# Patient Record
Sex: FEMALE | Race: BLACK OR AFRICAN AMERICAN
[De-identification: names, ages, dates, MRNs, and addresses within clinical notes are randomized per-mention and may not be internally consistent; named-entity substitution may affect disease eponyms.]

---

## 2020-02-14 ENCOUNTER — HOSPITAL ENCOUNTER (EMERGENCY)
Dept: HOSPITAL 5 - ED | Age: 80
LOS: 1 days | Discharge: OTHER INSTITUTION | End: 2020-02-15
Payer: MEDICARE

## 2020-02-14 DIAGNOSIS — I10: ICD-10-CM
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
## 2020-02-14 DIAGNOSIS — Z79.899: ICD-10-CM
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
## 2020-02-14 DIAGNOSIS — R07.89: ICD-10-CM

## 2020-02-14 DIAGNOSIS — Z98.890: ICD-10-CM
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
## 2020-02-14 DIAGNOSIS — S22.22XA: Primary | ICD-10-CM

## 2020-02-14 DIAGNOSIS — J81.0: ICD-10-CM

## 2020-02-14 DIAGNOSIS — E11.9: ICD-10-CM

## 2020-02-14 LAB
BASOPHILS # (AUTO): 0.1 K/MM3 (ref 0–0.1)
BASOPHILS NFR BLD AUTO: 1 % (ref 0–1.8)
BUN SERPL-MCNC: 19 MG/DL (ref 7–17)
BUN/CREAT SERPL: 24 %
CALCIUM SERPL-MCNC: 9.8 MG/DL (ref 8.4–10.2)
EOSINOPHIL # BLD AUTO: 0.3 K/MM3 (ref 0–0.4)
EOSINOPHIL NFR BLD AUTO: 3.5 % (ref 0–4.3)
HCT VFR BLD CALC: 39.1 % (ref 30.3–42.9)
HEMOLYSIS INDEX: 6
HGB BLD-MCNC: 12.6 GM/DL (ref 10.1–14.3)
LYMPHOCYTES # BLD AUTO: 2.3 K/MM3 (ref 1.2–5.4)
LYMPHOCYTES NFR BLD AUTO: 26.4 % (ref 13.4–35)
MCHC RBC AUTO-ENTMCNC: 32 % (ref 30–34)
MCV RBC AUTO: 70 FL (ref 79–97)
MONOCYTES # (AUTO): 0.8 K/MM3 (ref 0–0.8)
MONOCYTES % (AUTO): 9.6 % (ref 0–7.3)
PLATELET # BLD: 215 K/MM3 (ref 140–440)
RBC # BLD AUTO: 5.62 M/MM3 (ref 3.65–5.03)

## 2020-02-14 PROCEDURE — 96376 TX/PRO/DX INJ SAME DRUG ADON: CPT

## 2020-02-14 PROCEDURE — 93005 ELECTROCARDIOGRAM TRACING: CPT

## 2020-02-14 PROCEDURE — 84484 ASSAY OF TROPONIN QUANT: CPT

## 2020-02-14 PROCEDURE — 36415 COLL VENOUS BLD VENIPUNCTURE: CPT

## 2020-02-14 PROCEDURE — 96374 THER/PROPH/DIAG INJ IV PUSH: CPT

## 2020-02-14 PROCEDURE — 93010 ELECTROCARDIOGRAM REPORT: CPT

## 2020-02-14 PROCEDURE — 96375 TX/PRO/DX INJ NEW DRUG ADDON: CPT

## 2020-02-14 PROCEDURE — 71275 CT ANGIOGRAPHY CHEST: CPT

## 2020-02-14 PROCEDURE — 85025 COMPLETE CBC W/AUTO DIFF WBC: CPT

## 2020-02-14 PROCEDURE — 80048 BASIC METABOLIC PNL TOTAL CA: CPT

## 2020-02-14 PROCEDURE — 71045 X-RAY EXAM CHEST 1 VIEW: CPT

## 2020-02-14 PROCEDURE — 99285 EMERGENCY DEPT VISIT HI MDM: CPT

## 2020-02-14 NOTE — EVENT NOTE
ED Screening Note


Date of service: 02/14/20


Time: 19:15


ED Screening Note: 


Chest pain 





This initial assessment/diagnostic orders/clinical plan/treatment(s) is/are 

subject to change based on patients health status, clinical progression and re-

assessment by fellow clinical providers in the ED. Further treatment and workup 

at subsequent clinical providers discretion. Patient/guardian urged not to elope

from the ED as their condition may be serious if not clinically assessed and 

managed. 





Initial orders include:
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
## 2020-02-14 NOTE — XRAY REPORT
CHEST 1 VIEW 



INDICATION / CLINICAL INFORMATION:

Chest Pain.



COMPARISON: 

3/25/2011



FINDINGS:



SUPPORT DEVICES: None.



HEART / MEDIASTINUM: Critics silhouette size is upper limits of normal. 



LUNGS / PLEURA: There is mild pulmonary vascular congestion present. The lungs are otherwise grossly 
clear. No significant pleural effusion. No pneumothorax. 



ADDITIONAL FINDINGS: No significant additional findings.



IMPRESSION:

1. Mild pulmonary vascular congestion. The lungs are grossly clear otherwise..



Signer Name: Marcela Hope MD 

Signed: 2/14/2020 7:46 PM

 Workstation Name: [id number]
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
## 2020-02-15 VITALS — SYSTOLIC BLOOD PRESSURE: 107 MMHG | DIASTOLIC BLOOD PRESSURE: 71 MMHG

## 2020-12-08 ENCOUNTER — HOSPITAL ENCOUNTER (EMERGENCY)
Dept: HOSPITAL 5 - ED | Age: 80
LOS: 1 days | Discharge: HOME | End: 2020-12-09
Payer: MEDICARE

## 2020-12-08 DIAGNOSIS — Z20.828: ICD-10-CM

## 2020-12-08 DIAGNOSIS — I10: ICD-10-CM

## 2020-12-08 DIAGNOSIS — J06.9: Primary | ICD-10-CM

## 2020-12-08 DIAGNOSIS — E11.9: ICD-10-CM

## 2020-12-08 DIAGNOSIS — Z79.899: ICD-10-CM

## 2020-12-08 DIAGNOSIS — Z98.890: ICD-10-CM

## 2020-12-08 LAB
ALBUMIN SERPL-MCNC: 4.1 G/DL (ref 3.9–5)
ALT SERPL-CCNC: 26 UNITS/L (ref 7–56)
BASOPHILS # (AUTO): 0.1 K/MM3 (ref 0–0.1)
BASOPHILS NFR BLD AUTO: 0.7 % (ref 0–1.8)
BUN SERPL-MCNC: 17 MG/DL (ref 7–17)
BUN/CREAT SERPL: 21 %
CALCIUM SERPL-MCNC: 9 MG/DL (ref 8.4–10.2)
EOSINOPHIL # BLD AUTO: 0 K/MM3 (ref 0–0.4)
EOSINOPHIL NFR BLD AUTO: 0.2 % (ref 0–4.3)
HCT VFR BLD CALC: 38.3 % (ref 30.3–42.9)
HEMOLYSIS INDEX: 2
HGB BLD-MCNC: 12.4 GM/DL (ref 10.1–14.3)
INR PPP: 1.05 (ref 0.87–1.13)
LYMPHOCYTES # BLD AUTO: 1.8 K/MM3 (ref 1.2–5.4)
LYMPHOCYTES NFR BLD AUTO: 20.4 % (ref 13.4–35)
MCHC RBC AUTO-ENTMCNC: 32 % (ref 30–34)
MCV RBC AUTO: 70 FL (ref 79–97)
MONOCYTES # (AUTO): 1.1 K/MM3 (ref 0–0.8)
MONOCYTES % (AUTO): 12.4 % (ref 0–7.3)
PLATELET # BLD: 231 K/MM3 (ref 140–440)
RBC # BLD AUTO: 5.46 M/MM3 (ref 3.65–5.03)

## 2020-12-08 PROCEDURE — 86140 C-REACTIVE PROTEIN: CPT

## 2020-12-08 PROCEDURE — 87040 BLOOD CULTURE FOR BACTERIA: CPT

## 2020-12-08 PROCEDURE — 87086 URINE CULTURE/COLONY COUNT: CPT

## 2020-12-08 PROCEDURE — 82947 ASSAY GLUCOSE BLOOD QUANT: CPT

## 2020-12-08 PROCEDURE — 83615 LACTATE (LD) (LDH) ENZYME: CPT

## 2020-12-08 PROCEDURE — 82805 BLOOD GASES W/O2 SATURATION: CPT

## 2020-12-08 PROCEDURE — 71275 CT ANGIOGRAPHY CHEST: CPT

## 2020-12-08 PROCEDURE — 81001 URINALYSIS AUTO W/SCOPE: CPT

## 2020-12-08 PROCEDURE — 82728 ASSAY OF FERRITIN: CPT

## 2020-12-08 PROCEDURE — 71046 X-RAY EXAM CHEST 2 VIEWS: CPT

## 2020-12-08 PROCEDURE — 84484 ASSAY OF TROPONIN QUANT: CPT

## 2020-12-08 PROCEDURE — 85025 COMPLETE CBC W/AUTO DIFF WBC: CPT

## 2020-12-08 PROCEDURE — 99285 EMERGENCY DEPT VISIT HI MDM: CPT

## 2020-12-08 PROCEDURE — 96374 THER/PROPH/DIAG INJ IV PUSH: CPT

## 2020-12-08 PROCEDURE — 36415 COLL VENOUS BLD VENIPUNCTURE: CPT

## 2020-12-08 PROCEDURE — 80053 COMPREHEN METABOLIC PANEL: CPT

## 2020-12-08 PROCEDURE — 82140 ASSAY OF AMMONIA: CPT

## 2020-12-08 PROCEDURE — 85610 PROTHROMBIN TIME: CPT

## 2020-12-08 PROCEDURE — 93005 ELECTROCARDIOGRAM TRACING: CPT

## 2020-12-08 PROCEDURE — 85379 FIBRIN DEGRADATION QUANT: CPT

## 2020-12-08 PROCEDURE — 84145 PROCALCITONIN (PCT): CPT

## 2020-12-08 NOTE — XRAY REPORT
CHEST 2 VIEWS 



INDICATION / CLINICAL INFORMATION:

possible Sepsis.



COMPARISON: 

February 14 2020



FINDINGS:



SUPPORT DEVICES: None.

HEART / MEDIASTINUM: No significant abnormality. 

LUNGS / PLEURA: Mild bibasilar atelectasis similar when compared to 2/14/2020. No significant pulmona
ry or pleural abnormality. No pneumothorax. 



ADDITIONAL FINDINGS: No significant additional findings.



IMPRESSION:

1. No acute findings.



Signer Name: Buck Shetty MD 

Signed: 12/8/2020 5:02 PM

Workstation Name: [id number]

## 2020-12-09 VITALS — DIASTOLIC BLOOD PRESSURE: 61 MMHG | SYSTOLIC BLOOD PRESSURE: 110 MMHG

## 2020-12-09 LAB
BILIRUB UR QL STRIP: (no result)
BLOOD UR QL VISUAL: (no result)
CRP SERPL-MCNC: 4.6 MG/DL (ref 0–1.3)
MUCOUS THREADS #/AREA URNS HPF: (no result) /HPF
PH UR STRIP: 6 [PH] (ref 5–7)
PROT UR STRIP-MCNC: (no result) MG/DL
RBC #/AREA URNS HPF: 3 /HPF (ref 0–6)
UROBILINOGEN UR-MCNC: < 2 MG/DL (ref ?–2)
WBC #/AREA URNS HPF: 2 /HPF (ref 0–6)

## 2020-12-09 NOTE — EMERGENCY DEPARTMENT REPORT
HPI





- General


Chief Complaint: Dyspnea/Respdistress


Time Seen by Provider: 12/09/20 08:11





- HPI


HPI: 





This is an 80-year-old  female who presents to the emergency department 

from the office of her PCP with a complaint of a 3-day history of fever, mixed 

dry and productive cough, shortness of breath, chills and generalized body 

aches.  She was seen by her PCP yesterday, Dr. Titi Fishman, and sent in for 

further evaluation with concern of COVID-19.  no Covid test done.  Patient has a

past medical history of hypertension and diabetes.  No recent travel or sick 

contacts at home.  No known exposure to anyone with COVID-19.  Patient does 

complain of some chest, or chest wall, discomfort that occurs when the patient 

is coughing.  She denies any lower extremity swelling, abdominal pain, vomiting,

diarrhea.  The patient does not speak any English, but the patient's daughter is

currently at bedside translating for us.





ED Past Medical Hx





- Past Medical History


Previous Medical History?: Yes


Hx Hypertension: Yes


Hx Diabetes: Yes





- Surgical History


Additional Surgical History: Unk, possible liver surgery





- Social History


Smoking Status: Never Smoker


Substance Use Type: None





- Medications


Home Medications: 


                                Home Medications











 Medication  Instructions  Recorded  Confirmed  Last Taken  Type


 


Amlodipine Bes/Olmesartan Med 5 mg PO DAILY 03/18/14 09/13/15 03/16/14 History





[Rakel 10-20 mg]     


 


Metoprolol [Lopressor TAB] 25 mg PO DAILY 03/18/14 09/13/15 03/16/14 History


 


Omega-3 Fatty Acids/Fish Oil [Fish 1,000 mg PO DAILY 03/18/14 09/13/15 03/16/14 

History





Oil]     


 


raNITIdine HCL [Ranitidine 300mg 300 mg PO DAILY 03/18/14 09/13/15 03/16/14 

History





Cap]     


 


Ondansetron [Zofran TAB] 4 mg PO Q6HR PRN #10 tablet 09/05/14 09/13/15 Unknown 

Rx


 


traMADoL [Ultram 50 MG tab] 50 mg PO Q6HR PRN #14 tablet 09/05/14 09/13/15 

Unknown Rx


 


Aspirin [Aspirin BABY CHEW TAB] 81 mg PO QDAY  tab.chew 09/14/15  Unknown Rx


 


Albuterol Mdi (or & Nicu Only) 2 puff IH QID PRN #8.5 gram 12/09/20  Unknown Rx





[ProAir HFA Inhaler]     


 


Benzonatate [Tessalon Perles] 100 mg PO Q8HR PRN #20 capsule 12/09/20  Unknown 

Rx


 


predniSONE [Deltasone] 20 mg PO QDAY #3 tab 12/09/20  Unknown Rx














ED Review of Systems


ROS: 


Stated complaint: SOB/FEVER/COUGHING


Other details as noted in HPI





Comment: All other systems reviewed and negative


Constitutional: chills, fever


Eyes: denies: eye pain, vision change


ENT: denies: ear pain, throat pain


Respiratory: cough, shortness of breath


Cardiovascular: chest pain.  denies: palpitations, edema


Gastrointestinal: denies: abdominal pain, vomiting


Genitourinary: denies: dysuria, discharge


Musculoskeletal: myalgia.  denies: joint swelling


Skin: denies: rash, lesions


Neurological: denies: numbness, paresthesias





Physical Exam





- Physical Exam


Vital Signs: 


                                   Vital Signs











  12/08/20 12/09/20 12/09/20





  16:22 04:31 08:15


 


Temperature 101.9 F H 100.4 F H 98.7 F


 


Pulse Rate 113 H 116 H 


 


Respiratory 24 19 





Rate   


 


Blood Pressure  120/63 


 


Blood Pressure 133/78  





[Right]   


 


O2 Sat by Pulse 94 92 





Oximetry   














  12/09/20





  08:19


 


Temperature 


 


Pulse Rate 100 H


 


Respiratory 23





Rate 


 


Blood Pressure 


 


Blood Pressure 109/75





[Right] 


 


O2 Sat by Pulse 95





Oximetry 











Physical Exam: 





GENERAL: The patient is well-developed well-nourished.


HENT: Normocephalic.  Atraumatic.    Patient has moist mucous membranes.


EYES: Extraocular motions are intact.  


NECK: Supple. Trachea is midline.


CHEST/LUNGS: Clear to auscultation.  A productive sounding cough heard during 

examination.  No tachypnea or accessory muscle use.  There is no respiratory 

distress noted.


HEART/CARDIOVASCULAR: Regular.  There is mild tachycardia.  There is no murmur.


ABDOMEN: Abdomen is soft, nontender.  Patient has normal bowel sounds.  


SKIN: Skin is warm and dry. 


NEURO: The patient is awake, alert, and oriented.  The patient is cooperative. 

Normal speech.


MUSCULOSKELETAL: There is no tenderness or deformity.  There is no limitation 

range of motion.  





ED Course


                                   Vital Signs











  12/08/20 12/09/20 12/09/20





  16:22 04:31 08:15


 


Temperature 101.9 F H 100.4 F H 98.7 F


 


Pulse Rate 113 H 116 H 


 


Respiratory 24 19 





Rate   


 


Blood Pressure  120/63 


 


Blood Pressure 133/78  





[Right]   


 


O2 Sat by Pulse 94 92 





Oximetry   














  12/09/20





  08:19


 


Temperature 


 


Pulse Rate 100 H


 


Respiratory 23





Rate 


 


Blood Pressure 


 


Blood Pressure 109/75





[Right] 


 


O2 Sat by Pulse 95





Oximetry 














- Reevaluation(s)


Reevaluation #1: 





12/09/20 15:05


                                   Lab Results











  12/08/20 12/08/20 12/08/20 Range/Units





  17:04 17:04 17:04 


 


WBC  9.0    (4.5-11.0)  K/mm3


 


RBC  5.46 H    (3.65-5.03)  M/mm3


 


Hgb  12.4    (10.1-14.3)  gm/dl


 


Hct  38.3    (30.3-42.9)  %


 


MCV  70 L    (79-97)  fl


 


MCH  23 L    (28-32)  pg


 


MCHC  32    (30-34)  %


 


RDW  16.5 H    (13.2-15.2)  %


 


Plt Count  231    (140-440)  K/mm3


 


Lymph % (Auto)  20.4    (13.4-35.0)  %


 


Mono % (Auto)  12.4 H    (0.0-7.3)  %


 


Eos % (Auto)  0.2    (0.0-4.3)  %


 


Baso % (Auto)  0.7    (0.0-1.8)  %


 


Lymph # (Auto)  1.8    (1.2-5.4)  K/mm3


 


Mono # (Auto)  1.1 H    (0.0-0.8)  K/mm3


 


Eos # (Auto)  0.0    (0.0-0.4)  K/mm3


 


Baso # (Auto)  0.1    (0.0-0.1)  K/mm3


 


Seg Neutrophils %  66.3    (40.0-70.0)  %


 


Seg Neutrophils #  6.0    (1.8-7.7)  K/mm3


 


PT   13.6   (12.2-14.9)  Sec.


 


INR   1.05   (0.87-1.13)  


 


D-Dimer     (0-234)  ng/mlDDU


 


VBG pH     (7.320-7.420)  


 


Sodium    132 L  (137-145)  mmol/L


 


Potassium    3.7  (3.6-5.0)  mmol/L


 


Chloride    96.4 L  ()  mmol/L


 


Carbon Dioxide    23  (22-30)  mmol/L


 


Anion Gap    16  mmol/L


 


BUN    17  (7-17)  mg/dL


 


Creatinine    0.8  (0.6-1.2)  mg/dL


 


Estimated GFR    > 60  ml/min


 


BUN/Creatinine Ratio    21  %


 


Glucose    172 H  ()  mg/dL


 


Lactic Acid     (0.7-2.0)  mmol/L


 


Calcium    9.0  (8.4-10.2)  mg/dL


 


Ferritin     (10.0-200.0)  ng/mL


 


Total Bilirubin    0.20  (0.1-1.2)  mg/dL


 


AST    21  (5-40)  units/L


 


ALT    26  (7-56)  units/L


 


Alkaline Phosphatase    57  ()  units/L


 


Lactate Dehydrogenase     ()  units/L


 


Troponin T     (0.00-0.029)  ng/mL


 


C-Reactive Protein     (0.00-1.30)  mg/dL


 


Total Protein    7.7  (6.3-8.2)  g/dL


 


Albumin    4.1  (3.9-5)  g/dL


 


Albumin/Globulin Ratio    1.1  %


 


Procalcitonin     (<0.15)  ng/mL


 


Urine Color     (Yellow)  


 


Urine Turbidity     (Clear)  


 


Urine pH     (5.0-7.0)  


 


Ur Specific Gravity     (1.003-1.030)  


 


Urine Protein     (Negative)  mg/dL


 


Urine Glucose (UA)     (Negative)  mg/dL


 


Urine Ketones     (Negative)  mg/dL


 


Urine Blood     (Negative)  


 


Urine Nitrite     (Negative)  


 


Urine Bilirubin     (Negative)  


 


Urine Urobilinogen     (<2.0)  mg/dL


 


Ur Leukocyte Esterase     (Negative)  


 


Urine WBC (Auto)     (0.0-6.0)  /HPF


 


Urine RBC (Auto)     (0.0-6.0)  /HPF


 


U Epithel Cells (Auto)     (0-13.0)  /HPF


 


Urine Mucus     /HPF














  12/08/20 12/08/20 12/08/20 Range/Units





  17:04 17:04 19:42 


 


WBC     (4.5-11.0)  K/mm3


 


RBC     (3.65-5.03)  M/mm3


 


Hgb     (10.1-14.3)  gm/dl


 


Hct     (30.3-42.9)  %


 


MCV     (79-97)  fl


 


MCH     (28-32)  pg


 


MCHC     (30-34)  %


 


RDW     (13.2-15.2)  %


 


Plt Count     (140-440)  K/mm3


 


Lymph % (Auto)     (13.4-35.0)  %


 


Mono % (Auto)     (0.0-7.3)  %


 


Eos % (Auto)     (0.0-4.3)  %


 


Baso % (Auto)     (0.0-1.8)  %


 


Lymph # (Auto)     (1.2-5.4)  K/mm3


 


Mono # (Auto)     (0.0-0.8)  K/mm3


 


Eos # (Auto)     (0.0-0.4)  K/mm3


 


Baso # (Auto)     (0.0-0.1)  K/mm3


 


Seg Neutrophils %     (40.0-70.0)  %


 


Seg Neutrophils #     (1.8-7.7)  K/mm3


 


PT     (12.2-14.9)  Sec.


 


INR     (0.87-1.13)  


 


D-Dimer     (0-234)  ng/mlDDU


 


VBG pH   7.476 H   (7.320-7.420)  


 


Sodium     (137-145)  mmol/L


 


Potassium     (3.6-5.0)  mmol/L


 


Chloride     ()  mmol/L


 


Carbon Dioxide     (22-30)  mmol/L


 


Anion Gap     mmol/L


 


BUN     (7-17)  mg/dL


 


Creatinine     (0.6-1.2)  mg/dL


 


Estimated GFR     ml/min


 


BUN/Creatinine Ratio     %


 


Glucose     ()  mg/dL


 


Lactic Acid  2.20 H*   1.60  (0.7-2.0)  mmol/L


 


Calcium     (8.4-10.2)  mg/dL


 


Ferritin     (10.0-200.0)  ng/mL


 


Total Bilirubin     (0.1-1.2)  mg/dL


 


AST     (5-40)  units/L


 


ALT     (7-56)  units/L


 


Alkaline Phosphatase     ()  units/L


 


Lactate Dehydrogenase     ()  units/L


 


Troponin T     (0.00-0.029)  ng/mL


 


C-Reactive Protein     (0.00-1.30)  mg/dL


 


Total Protein     (6.3-8.2)  g/dL


 


Albumin     (3.9-5)  g/dL


 


Albumin/Globulin Ratio     %


 


Procalcitonin     (<0.15)  ng/mL


 


Urine Color     (Yellow)  


 


Urine Turbidity     (Clear)  


 


Urine pH     (5.0-7.0)  


 


Ur Specific Gravity     (1.003-1.030)  


 


Urine Protein     (Negative)  mg/dL


 


Urine Glucose (UA)     (Negative)  mg/dL


 


Urine Ketones     (Negative)  mg/dL


 


Urine Blood     (Negative)  


 


Urine Nitrite     (Negative)  


 


Urine Bilirubin     (Negative)  


 


Urine Urobilinogen     (<2.0)  mg/dL


 


Ur Leukocyte Esterase     (Negative)  


 


Urine WBC (Auto)     (0.0-6.0)  /HPF


 


Urine RBC (Auto)     (0.0-6.0)  /HPF


 


U Epithel Cells (Auto)     (0-13.0)  /HPF


 


Urine Mucus     /HPF














  12/09/20 12/09/20 12/09/20 Range/Units





  09:56 09:56 09:56 


 


WBC     (4.5-11.0)  K/mm3


 


RBC     (3.65-5.03)  M/mm3


 


Hgb     (10.1-14.3)  gm/dl


 


Hct     (30.3-42.9)  %


 


MCV     (79-97)  fl


 


MCH     (28-32)  pg


 


MCHC     (30-34)  %


 


RDW     (13.2-15.2)  %


 


Plt Count     (140-440)  K/mm3


 


Lymph % (Auto)     (13.4-35.0)  %


 


Mono % (Auto)     (0.0-7.3)  %


 


Eos % (Auto)     (0.0-4.3)  %


 


Baso % (Auto)     (0.0-1.8)  %


 


Lymph # (Auto)     (1.2-5.4)  K/mm3


 


Mono # (Auto)     (0.0-0.8)  K/mm3


 


Eos # (Auto)     (0.0-0.4)  K/mm3


 


Baso # (Auto)     (0.0-0.1)  K/mm3


 


Seg Neutrophils %     (40.0-70.0)  %


 


Seg Neutrophils #     (1.8-7.7)  K/mm3


 


PT     (12.2-14.9)  Sec.


 


INR     (0.87-1.13)  


 


D-Dimer  377.73 H    (0-234)  ng/mlDDU


 


VBG pH     (7.320-7.420)  


 


Sodium     (137-145)  mmol/L


 


Potassium     (3.6-5.0)  mmol/L


 


Chloride     ()  mmol/L


 


Carbon Dioxide     (22-30)  mmol/L


 


Anion Gap     mmol/L


 


BUN     (7-17)  mg/dL


 


Creatinine     (0.6-1.2)  mg/dL


 


Estimated GFR     ml/min


 


BUN/Creatinine Ratio     %


 


Glucose   122 H   ()  mg/dL


 


Lactic Acid     (0.7-2.0)  mmol/L


 


Calcium     (8.4-10.2)  mg/dL


 


Ferritin    372.5 H  (10.0-200.0)  ng/mL


 


Total Bilirubin     (0.1-1.2)  mg/dL


 


AST     (5-40)  units/L


 


ALT     (7-56)  units/L


 


Alkaline Phosphatase     ()  units/L


 


Lactate Dehydrogenase   176   ()  units/L


 


Troponin T   < 0.010   (0.00-0.029)  ng/mL


 


C-Reactive Protein   4.60 H   (0.00-1.30)  mg/dL


 


Total Protein     (6.3-8.2)  g/dL


 


Albumin     (3.9-5)  g/dL


 


Albumin/Globulin Ratio     %


 


Procalcitonin     (<0.15)  ng/mL


 


Urine Color     (Yellow)  


 


Urine Turbidity     (Clear)  


 


Urine pH     (5.0-7.0)  


 


Ur Specific Gravity     (1.003-1.030)  


 


Urine Protein     (Negative)  mg/dL


 


Urine Glucose (UA)     (Negative)  mg/dL


 


Urine Ketones     (Negative)  mg/dL


 


Urine Blood     (Negative)  


 


Urine Nitrite     (Negative)  


 


Urine Bilirubin     (Negative)  


 


Urine Urobilinogen     (<2.0)  mg/dL


 


Ur Leukocyte Esterase     (Negative)  


 


Urine WBC (Auto)     (0.0-6.0)  /HPF


 


Urine RBC (Auto)     (0.0-6.0)  /HPF


 


U Epithel Cells (Auto)     (0-13.0)  /HPF


 


Urine Mucus     /HPF














  12/09/20 12/09/20 Range/Units





  09:56 Unknown 


 


WBC    (4.5-11.0)  K/mm3


 


RBC    (3.65-5.03)  M/mm3


 


Hgb    (10.1-14.3)  gm/dl


 


Hct    (30.3-42.9)  %


 


MCV    (79-97)  fl


 


MCH    (28-32)  pg


 


MCHC    (30-34)  %


 


RDW    (13.2-15.2)  %


 


Plt Count    (140-440)  K/mm3


 


Lymph % (Auto)    (13.4-35.0)  %


 


Mono % (Auto)    (0.0-7.3)  %


 


Eos % (Auto)    (0.0-4.3)  %


 


Baso % (Auto)    (0.0-1.8)  %


 


Lymph # (Auto)    (1.2-5.4)  K/mm3


 


Mono # (Auto)    (0.0-0.8)  K/mm3


 


Eos # (Auto)    (0.0-0.4)  K/mm3


 


Baso # (Auto)    (0.0-0.1)  K/mm3


 


Seg Neutrophils %    (40.0-70.0)  %


 


Seg Neutrophils #    (1.8-7.7)  K/mm3


 


PT    (12.2-14.9)  Sec.


 


INR    (0.87-1.13)  


 


D-Dimer    (0-234)  ng/mlDDU


 


VBG pH    (7.320-7.420)  


 


Sodium    (137-145)  mmol/L


 


Potassium    (3.6-5.0)  mmol/L


 


Chloride    ()  mmol/L


 


Carbon Dioxide    (22-30)  mmol/L


 


Anion Gap    mmol/L


 


BUN    (7-17)  mg/dL


 


Creatinine    (0.6-1.2)  mg/dL


 


Estimated GFR    ml/min


 


BUN/Creatinine Ratio    %


 


Glucose    ()  mg/dL


 


Lactic Acid    (0.7-2.0)  mmol/L


 


Calcium    (8.4-10.2)  mg/dL


 


Ferritin    (10.0-200.0)  ng/mL


 


Total Bilirubin    (0.1-1.2)  mg/dL


 


AST    (5-40)  units/L


 


ALT    (7-56)  units/L


 


Alkaline Phosphatase    ()  units/L


 


Lactate Dehydrogenase    ()  units/L


 


Troponin T    (0.00-0.029)  ng/mL


 


C-Reactive Protein    (0.00-1.30)  mg/dL


 


Total Protein    (6.3-8.2)  g/dL


 


Albumin    (3.9-5)  g/dL


 


Albumin/Globulin Ratio    %


 


Procalcitonin  < 0.05   (<0.15)  ng/mL


 


Urine Color   Yellow  (Yellow)  


 


Urine Turbidity   Clear  (Clear)  


 


Urine pH   6.0  (5.0-7.0)  


 


Ur Specific Gravity   1.018  (1.003-1.030)  


 


Urine Protein   <15 mg/dl  (Negative)  mg/dL


 


Urine Glucose (UA)   50  (Negative)  mg/dL


 


Urine Ketones   Neg  (Negative)  mg/dL


 


Urine Blood   Sm  (Negative)  


 


Urine Nitrite   Neg  (Negative)  


 


Urine Bilirubin   Neg  (Negative)  


 


Urine Urobilinogen   < 2.0  (<2.0)  mg/dL


 


Ur Leukocyte Esterase   Neg  (Negative)  


 


Urine WBC (Auto)   2.0  (0.0-6.0)  /HPF


 


Urine RBC (Auto)   3.0  (0.0-6.0)  /HPF


 


U Epithel Cells (Auto)   2.0  (0-13.0)  /HPF


 


Urine Mucus   Few  /HPF














ED Medical Decision Making





- Lab Data


Result diagrams: 


                                 12/08/20 17:04





                                 12/09/20 09:56





- EKG Data


-: EKG Interpreted by Me


EKG shows normal: sinus rhythm, axis, intervals, QRS complexes, ST-T waves


Rate: normal





- EKG Data


When compared to previous EKG there are: previous EKG unavailable


Interpretation: normal EKG





- Radiology Data


Radiology results: report reviewed, image reviewed


interpreted by me: 





Chest x-ray does not show any acute process.  There are no pleural effusions, 

obvious pneumonia and there is no pneumothorax. No significant cardiomegaly.





CTA chest with contrast INDICATION : SOB, elevated dimer. TECHNIQUE: Axial 

imaging performed through the chest, with contrast bolus timing set to maximize 

opacification of the pulmonary arteries. 3-plane MIP reformatted images were 

obtained. All CT scans at this location are performed using CT dose reduction 

for ALARA by means of automated exposure control. 100 mL of intravenous contrast

 administered. COMPARISON: CTA chest from 2/14/2000 FINDINGS: Bolus: Contrast 

bolus timing is adequate. PTE: No filling defect is present to suggest PTE. 

Mediastinum: Heart and great vessels appear normal. No pathologic mediastinal 

adenopathy. Lungs: There is minimal streaky bibasilar airspace disease which 

most likely at least in part reflects atelectasis. No dense consolidation or 

pleural effusion. Upper abdomen: Limited imaging of the upper abdomen shows 

nothing acute. Several tiny hepatic cysts are present. Bones: Degenerative 

changes in the spine with nothing acute. IMPRESSION: 1. Negative for PTE. 2. 

Streaky bibasilar atelectasis. 





- Medical Decision Making





This patient presents to the emergency department from the office of her PCP 

yesterday with complaint of shortness of breath, cough and fever with concern 

for patient having COVID-19.  There was no report or mention of the patient 

having any hypoxia.  Patient's chart was first presented to me, and the patient 

brought back to the main emergency department, shortly after my shift began at 6

 AM this morning.





Chest x-ray did not show any pneumonia, pleural effusions, pneumothorax, or any 

acute process.





The patient's labs are mostly unremarkable except for elevated inflammatory 

markers that could be consistent with a COVID-19 infection.  Because of the 

complaint of shortness of breath and the elevated D-dimer level, the patient had

 a CT angiography of the chest completed.  The results do not show any evidence 

of pulmonary embolism, or any other acute process.





The patient has been reevaluated multiple times over multiple hours and has 

remained stable.  Vital signs have been reassuring throughout her ED course.  

Patient had an initial fever of about 102 F but it came down and stayed down 

with 1 dose of Tylenol given.  The patient has not had any hypoxia either at 

rest, or after a 2-minute exertional test.  For all these reasons the patient 

appears safe for discharge home at this time.





She appears to have a viral upper respiratory infection.  Given the elevated 

inflammatory markers found on her labs, as well as the current pandemic, there 

is a high suspicion that the patient could have COVID-19.  Unfortunately we do 

not have point-of-care testing for COVID-19 at this time and I am unable to test

 her for it through the emergency department.  Patient will be given a 

prescription for steroids, an albuterol inhaler and an antitussive medication.  

We discussed isolation/quarantine from those who are immunocompromised, elderly,

 or chronically ill/debilitated.  They will seek outpatient COVID-19 testing.  

We discussed getting a finger pulse oximetry monitor.





She will return to the emergency department with any worsening of her symptoms 

or with any acute distress.


Critical Care Time: No


Critical care attestation.: 


If time is entered above; I have spent that time in minutes in the direct care 

of this critically ill patient, excluding procedure time.








ED Disposition


Clinical Impression: 


 Suspected 2019 novel coronavirus infection, Viral upper respiratory tract infe

ction





Disposition: DC-01 TO HOME OR SELFCARE


Is pt being admited?: No


Condition: Stable


Instructions:  COVID-19, Viral Respiratory Infection


Additional Instructions: 


Please follow-up with your primary care physician in the next few days.





You appear to have a viral upper respiratory infection.  Given the current 

pandemic, I have suspicion that you have COVID-19.  I am unable to test you for 

COVID-19 at this time through the emergency department.  I recommend that you s

[place] outpatient COVID-19 testing.  This can be done at some primary care offices,

 some urgent cares, and there should be a listing of testing facilities through 

the [facility].





Please isolate/quarantine away from anybody who is immunocompromised, elderly, 

or chronically ill/debilitated.





Take your medications as prescribed.





I recommend that you try to obtain a finger pulse oximeter, which can tell your 

oxygen saturation.  If the oxygen saturation drops into the low 90s or below, 

you have any worsening of your symptoms, new or concerning symptoms not 

addressed during this emergency department, or any signs of distress, please go 

to the closest emergency department.


Prescriptions: 


predniSONE [Deltasone] 20 mg PO QDAY #3 tab


Albuterol Mdi (or & Nicu Only) [ProAir HFA Inhaler] 2 puff IH QID PRN #8.5 gram


 PRN Reason: Shortness Of Breath


Benzonatate [Tessalon Perles] 100 mg PO Q8HR PRN #20 capsule


 PRN Reason: Cough


Referrals: 


TITI FISHMAN MD [Staff Physician] - 2-3 Days


Time of Disposition: 13:01

## 2020-12-09 NOTE — CAT SCAN REPORT
CTA chest with contrast



INDICATION : SOB, elevated dimer.



TECHNIQUE:  Axial imaging performed through the chest, with contrast bolus timing set to maximize opa
cification of the pulmonary arteries. 3-plane MIP reformatted images were obtained.  All CT scans at 
this location are performed using CT dose reduction for ALARA by means of automated exposure control.
 



100 mL of intravenous contrast administered.



COMPARISON:  CTA chest from 2/14/2000



FINDINGS:  



Bolus:  Contrast bolus timing is adequate.

PTE:  No filling defect is present to suggest PTE.

Mediastinum:  Heart and great vessels appear normal.  No pathologic mediastinal adenopathy.

Lungs:  There is minimal streaky bibasilar airspace disease which most likely at least in part reflec
ts atelectasis. No dense consolidation or pleural effusion.



Upper abdomen:  Limited imaging of the upper abdomen shows nothing acute.  Several tiny hepatic cysts
 are present.

Bones:  Degenerative changes in the spine with nothing acute.



IMPRESSION: 

1. Negative for PTE.

2. Streaky bibasilar atelectasis.



Signer Name: Monroe Yu MD 

Signed: 12/9/2020 12:33 PM

Workstation Name: [id number]